# Patient Record
Sex: FEMALE | Race: WHITE | Employment: OTHER | URBAN - NONMETROPOLITAN AREA
[De-identification: names, ages, dates, MRNs, and addresses within clinical notes are randomized per-mention and may not be internally consistent; named-entity substitution may affect disease eponyms.]

---

## 2022-06-09 ENCOUNTER — OFFICE VISIT (OUTPATIENT)
Dept: CARDIOLOGY CLINIC | Age: 42
End: 2022-06-09
Payer: COMMERCIAL

## 2022-06-09 ENCOUNTER — PROCEDURE VISIT (OUTPATIENT)
Dept: CARDIOLOGY CLINIC | Age: 42
End: 2022-06-09
Payer: COMMERCIAL

## 2022-06-09 VITALS
BODY MASS INDEX: 31 KG/M2 | OXYGEN SATURATION: 98 % | HEIGHT: 66 IN | HEART RATE: 72 BPM | SYSTOLIC BLOOD PRESSURE: 122 MMHG | DIASTOLIC BLOOD PRESSURE: 80 MMHG | WEIGHT: 192.9 LBS

## 2022-06-09 DIAGNOSIS — R06.02 SOB (SHORTNESS OF BREATH): ICD-10-CM

## 2022-06-09 DIAGNOSIS — R07.9 CHEST PAIN, UNSPECIFIED TYPE: ICD-10-CM

## 2022-06-09 DIAGNOSIS — Z72.0 TOBACCO ABUSE: ICD-10-CM

## 2022-06-09 DIAGNOSIS — R42 DIZZINESS: ICD-10-CM

## 2022-06-09 DIAGNOSIS — R07.89 OTHER CHEST PAIN: ICD-10-CM

## 2022-06-09 DIAGNOSIS — E78.49 OTHER HYPERLIPIDEMIA: ICD-10-CM

## 2022-06-09 DIAGNOSIS — R07.9 CHEST PAIN, UNSPECIFIED TYPE: Primary | ICD-10-CM

## 2022-06-09 PROBLEM — I15.8 OTHER SECONDARY HYPERTENSION: Status: ACTIVE | Noted: 2022-06-09

## 2022-06-09 PROBLEM — I15.8 OTHER SECONDARY HYPERTENSION: Status: RESOLVED | Noted: 2022-06-09 | Resolved: 2022-06-09

## 2022-06-09 PROCEDURE — 93015 CV STRESS TEST SUPVJ I&R: CPT | Performed by: INTERNAL MEDICINE

## 2022-06-09 PROCEDURE — 93000 ELECTROCARDIOGRAM COMPLETE: CPT | Performed by: INTERNAL MEDICINE

## 2022-06-09 PROCEDURE — 99204 OFFICE O/P NEW MOD 45 MIN: CPT | Performed by: INTERNAL MEDICINE

## 2022-06-09 RX ORDER — ATORVASTATIN CALCIUM 40 MG/1
TABLET, FILM COATED ORAL
COMMUNITY
Start: 2022-04-11

## 2022-06-09 RX ORDER — LEVOTHYROXINE SODIUM 0.07 MG/1
TABLET ORAL
COMMUNITY
Start: 2022-06-03

## 2022-06-09 NOTE — PROGRESS NOTES
[facility]   Cardiac Evaluation      Patient: Primo Perez  YOB: 1980         Chief Complaint   Patient presents with    Chest Pain    Shortness of Breath    Dizziness    New Patient        Referring provider: Trevor DELEON DO    History of Present Illness:   Ms Lino Millan is seen today as a new patient. She states she had chest pain last week while at a restaurant. She had left sided chest pain that felt worse when taking a deep breath. She then developed diaphoresis. She states up until yesterday her chest felt sore. She feels run down and fatigued now. Trey Ulloa states she had a heart attack 20 years ago, six months after delivery but did not have any angiography. She was told it was due to stress. She states she has \"mild\" heart disease in the past; an echo of 2017 showed normal EF of 50-55% and trivial valve disease and states it is similar to another which was done in 2009. Mimi Valdovinos Her mom has high cholesterol and HTN; she is 70. Her dad has had MI's. Zahraa smokes 1/2 PPD. Medical history includes hypothyroid, hyperlipidemia, and TIA. She had gestational dm with her first pregnancy which was 22 years ago. She is on diability due to an ankle problem and states she was in a wheelchair for 5 years which ended 9 years ago. She had a CTA chest in 2018 for chest pain which was neg for PE. Past Medical History:   has a past medical history of TIA (transient ischemic attack). Surgical History:   has a past surgical history that includes Ankle surgery and Breast enhancement surgery. ave    Current Outpatient Medications   Medication Sig Dispense Refill    atorvastatin (LIPITOR) 40 MG tablet take 1 tablet by mouth once daily      betamethasone valerate (VALISONE) 0.1 % cream APPLY TO AFFECTED AREA AS DIRECTED      levothyroxine (SYNTHROID) 75 MCG tablet take 1 tablet by mouth once daily       No current facility-administered medications for this visit.        Allergies:  Patient has no known allergies. Social History:  Social History     Socioeconomic History    Marital status:      Spouse name: Not on file    Number of children: Not on file    Years of education: Not on file    Highest education level: Not on file   Occupational History    On disability for ankle problem. Previously employed in . Tobacco Use    Smoking status: Current Every Day Smoker     Packs/day: 0.50     Years: 20.00     Pack years: 10.00     Types: Cigarettes    Smokeless tobacco: Never Used   Vaping Use    Vaping Use: Former   Substance and Sexual Activity    Alcohol use: Yes     Comment: Occ    Drug use: Never    Sexual activity: Not on file   Other Topics Concern    Not on file   Social History Narrative    Not on file     Social Determinants of Health     Financial Resource Strain:     Difficulty of Paying Living Expenses: Not on file   Food Insecurity:     Worried About Running Out of Food in the Last Year: Not on file    Geraldine of Food in the Last Year: Not on file   Transportation Needs:     Lack of Transportation (Medical): Not on file    Lack of Transportation (Non-Medical):  Not on file   Physical Activity:     Days of Exercise per Week: Not on file    Minutes of Exercise per Session: Not on file   Stress:     Feeling of Stress : Not on file   Social Connections:     Frequency of Communication with Friends and Family: Not on file    Frequency of Social Gatherings with Friends and Family: Not on file    Attends Alevism Services: Not on file    Active Member of Clubs or Organizations: Not on file    Attends Club or Organization Meetings: Not on file    Marital Status: Not on file   Intimate Partner Violence:     Fear of Current or Ex-Partner: Not on file    Emotionally Abused: Not on file    Physically Abused: Not on file    Sexually Abused: Not on file   Housing Stability:     Unable to Pay for Housing in the Last Year: Not on file    Number of Places Lived in the Last Year: Not on file    Unstable Housing in the Last Year: Not on file       Family History:   Family History   Problem Relation Age of Onset    High Cholesterol Maternal Grandfather     Hypertension Maternal Grandfather     Heart Attack Maternal Grandfather     Hypertension Paternal Grandfather     High Cholesterol Paternal Grandfather      Family history has been reviewed and not pertinent except as noted above. Review of Systems:   · Constitutional: there has been no unanticipated weight loss. No change in energy or activity level   · Eyes: No visual changes   · ENT: No Headaches, hearing loss or vertigo. No mouth sores or sore throat. · Cardiovascular: Reviewed in HPI  · Respiratory: No cough or wheezing, no sputum production. · Gastrointestinal: No abdominal pain, appetite loss, blood in stools. No change in bowel or bladder habits. · Genitourinary: No nocturia, dysuria, trouble voiding  · Musculoskeletal:  No gait disturbance, weakness or joint complaints. · Integumentary: No rash or pruritis. · Neurological: No headache, change in muscle strength, numbness or tingling. No change in gait, balance, coordination, mood, affect, memory, mentation, behavior. · Psychiatric: No anxiety or depression  · Endocrine: No malaise or fever  · Hematologic/Lymphatic: No abnormal bruising or bleeding, blood clots or swollen lymph nodes. · Allergic/Immunologic: No nasal congestion or hives. Physical Examination:    Vitals:    06/09/22 1327   BP: 122/80   Site: Left Upper Arm   Position: Sitting   Cuff Size: Medium Adult   Pulse: 72   SpO2: 98%   Weight: 192 lb 14.4 oz (87.5 kg)   Height: 5' 6\" (1.676 m)     Body mass index is 31.13 kg/m². Wt Readings from Last 3 Encounters:   06/09/22 192 lb 14.4 oz (87.5 kg)      BP Readings from Last 3 Encounters:   06/09/22 122/80        Physical Examination:    · CONSTITUTIONAL: Well developed, well nourished  · EYES: PERRLA.  No xanthelasma, sclera non icteric  · EARS,NOSE,MOUTH,THROAT:  Mucous membranes moist, normal hearing  · NECK: Supple, JVP normal, thyroid not enlarged. Carotids 2+ without bruits  · RESPIRATORY: Normal effort, no rales or rhonchi  · CARDIOVASCULAR: Normal PMI, regular rate and rhythm, no murmurs, rub or gallop. No edema. Radial pulses present and equal  · CHEST: No scar or masses  · ABDOMEN: Normal bowel sounds. No masses or tenderness. No bruit  · MUSCULOSKELETAL: No clubbing or cyanosis. Moves all extremities well. Normal gait  · SKIN:  Warm and dry. No rashes  · NEUROLOGIC: Cranial nerves intact. Alert and oriented  · PSYCHIATRIC: Calm affect. Appears to have normal judgement and insight        Assessment/Plan  1. Chest pain, unspecified type -EKG done today and interpreted by me shows >sinus rhythm RSR'. Her episode of chest pain is very atypical for CAD and was most likely related to a viral syndrome with pleuritic chest pain, pallor and continued soreness and fatigue for 7 days. 2. Other hyperlipidemia - lipitor 40mg daily, labs 4/11/22: ; TRIG 287; HDL 39;    3. Tobacco abuse - smokes 1/2 PPD I instructed her to stop smoking. PLAN:  Plain stress test; she thinks she can walk on the TM here today. She walked 9 min to a max HR of 150 with no chest pain or EKG changes or ectopy. She had some dizziness at peak exercise with a normal BP response to exercise and normal sats. Low risk for CAD       Scribe's attestation: This note was scribed in the presence of Dr Fanta Baker MD by Bart Argueta RN. The scribe's documentation has been prepared under my direction and personally reviewed by me in its entirety. I confirm that the note above accurately reflects all work, treatment, procedures, and medical decision making performed by me. Thank you for allowing to me to participate in the care of Mckenna.